# Patient Record
Sex: MALE | Race: OTHER | Employment: FULL TIME | URBAN - METROPOLITAN AREA
[De-identification: names, ages, dates, MRNs, and addresses within clinical notes are randomized per-mention and may not be internally consistent; named-entity substitution may affect disease eponyms.]

---

## 2017-02-17 ENCOUNTER — APPOINTMENT (OUTPATIENT)
Dept: OCCUPATIONAL MEDICINE | Age: 52
End: 2017-02-17
Attending: EMERGENCY MEDICINE

## 2017-12-12 ENCOUNTER — OFFICE VISIT (OUTPATIENT)
Dept: ENDOCRINOLOGY CLINIC | Facility: CLINIC | Age: 52
End: 2017-12-12

## 2017-12-12 ENCOUNTER — TELEPHONE (OUTPATIENT)
Dept: ENDOCRINOLOGY CLINIC | Facility: CLINIC | Age: 52
End: 2017-12-12

## 2017-12-12 VITALS
SYSTOLIC BLOOD PRESSURE: 170 MMHG | DIASTOLIC BLOOD PRESSURE: 103 MMHG | BODY MASS INDEX: 30.97 KG/M2 | WEIGHT: 195 LBS | HEIGHT: 66.5 IN | HEART RATE: 67 BPM

## 2017-12-12 DIAGNOSIS — E29.1 HYPOGONADISM IN MALE: Primary | ICD-10-CM

## 2017-12-12 PROCEDURE — 99212 OFFICE O/P EST SF 10 MIN: CPT | Performed by: INTERNAL MEDICINE

## 2017-12-12 PROCEDURE — 99243 OFF/OP CNSLTJ NEW/EST LOW 30: CPT | Performed by: INTERNAL MEDICINE

## 2017-12-12 NOTE — PATIENT INSTRUCTIONS
Do labs tomorrow before 10 am  We will call you with results and will be starting testosterone replacement as below:      Start testosterone 126 every two weeks.    Repeat your blood work which has been ordered for you one week after the fifth testosterone

## 2017-12-12 NOTE — TELEPHONE ENCOUNTER
Per AM, patient's BP was high in clinic. RN to call patient to see if he is taking medication for hypertension. Also to see if he can check BP at home or at pharmacy. He states that his BP has been high.  He was dx with hypertension in the past but stop

## 2017-12-12 NOTE — H&P
New Patient Evaluation - History and Physical    CONSULT - Reason for Visit: Hypogonadism  Requesting Physician: Dr. Hilton Asif ( Phone number: [phone])    CHIEF COMPLAINT:  Patient presents with:   Other: low T       HISTORY OF PRESENT ILLNESS:   Casandra Angles Externally Cream Apply 1 Application topically 2 (two) times daily. Apply to dark skin spots only.  Disp: 28.4 g Rfl: 4       ALLERGIES:    Pcn [Penicillins]           SOCIAL HISTORY:    Social History    Marital status:             Spouse name: not enlarged and no tenderness  HEMATOLOGIC/LYMPHATICS:  no cervical lymphadenopathy and no supraclavicular lymphadenopathy  LUNGS: clear to auscultation bilaterally, no crackles or wheezing  CARDIOVASCULAR:  regular rate and rhythm, normal S1 and S2  ABDO a deep vein (deep vein thrombosis), which could break loose, travel through the bloodstream and lodge in the  lungs, blocking blood flow (pulmonary embolism), could adversely effect liver and lipids and increase the number of red blood cells, a condition c

## 2017-12-13 ENCOUNTER — APPOINTMENT (OUTPATIENT)
Dept: LAB | Age: 52
End: 2017-12-13
Attending: INTERNAL MEDICINE
Payer: COMMERCIAL

## 2017-12-13 DIAGNOSIS — E29.1 HYPOGONADISM IN MALE: ICD-10-CM

## 2017-12-13 PROCEDURE — 83002 ASSAY OF GONADOTROPIN (LH): CPT

## 2017-12-13 PROCEDURE — 36415 COLL VENOUS BLD VENIPUNCTURE: CPT

## 2017-12-13 PROCEDURE — 83001 ASSAY OF GONADOTROPIN (FSH): CPT

## 2017-12-14 ENCOUNTER — TELEPHONE (OUTPATIENT)
Dept: ENDOCRINOLOGY CLINIC | Facility: CLINIC | Age: 52
End: 2017-12-14

## 2017-12-14 NOTE — TELEPHONE ENCOUNTER
Returned call to patient. He states he was going to start on testosterone replacement per AM but he had to complete labwork first. He is wondering if labwork has resulted and if ok to start on replacement?  Did let him know AM out of the office on Thursday

## 2017-12-14 NOTE — TELEPHONE ENCOUNTER
Pt calling regarding refill rx:Testorone Pill, pls call pt at:[phone],thanks.   *rx not on list

## 2017-12-15 RX ORDER — TESTOSTERONE CYPIONATE 200 MG/ML
126 INJECTION INTRAMUSCULAR
Qty: 10 ML | Refills: 0 | Status: SHIPPED | OUTPATIENT
Start: 2017-12-15 | End: 2018-01-12

## 2017-12-15 RX ORDER — SYRINGE W-NEEDLE,DISPOSAB,3 ML 25GX5/8"
SYRINGE, EMPTY DISPOSABLE MISCELLANEOUS
Qty: 50 EACH | Refills: 0 | Status: SHIPPED | OUTPATIENT
Start: 2017-12-15

## 2017-12-15 RX ORDER — SYRINGE W-NEEDLE,DISPOSAB,3 ML 25GX5/8"
SYRINGE, EMPTY DISPOSABLE MISCELLANEOUS
Qty: 50 EACH | Refills: 0 | Status: SHIPPED | OUTPATIENT
Start: 2017-12-15 | End: 2017-12-15

## 2017-12-15 NOTE — TELEPHONE ENCOUNTER
We can prescribe testosterone 126 mg q 2 weeks. Start testosterone 126 every two weeks. Repeat your blood work which has been ordered for you one week after the fifth testosterone injection.      Return in 6 months.

## 2017-12-15 NOTE — TELEPHONE ENCOUNTER
Patient was given education at [address] [place] [address] on how to inject testosterone at home. Cannot sign due to controlled substance. Orders pending. Called patient.  Left detailed message that he can start testosterone as previously discussed at his LOV and on [facility] instru

## 2017-12-15 NOTE — TELEPHONE ENCOUNTER
Pt calling back - states rx was sent to wrong pharm - needs to go to [facility] [place] - pls re send

## 2018-01-12 RX ORDER — TESTOSTERONE CYPIONATE 200 MG/ML
126 INJECTION INTRAMUSCULAR
Qty: 10 ML | Refills: 0 | Status: SHIPPED | OUTPATIENT
Start: 2018-01-12

## 2018-01-12 NOTE — TELEPHONE ENCOUNTER
Current Outpatient Prescriptions:  Testosterone cypionate 200 MG/ML Intramuscular Solution Inject 0.63 mL (126 mg total) into the muscle every 14 (fourteen) days.  Disp: 10 mL Rfl: 0

## 2019-09-02 ENCOUNTER — HOSPITAL ENCOUNTER (OUTPATIENT)
Age: 54
Discharge: HOME | End: 2019-09-02
Attending: FAMILY MEDICINE
Payer: COMMERCIAL

## 2019-09-02 VITALS
TEMPERATURE: 98 F | HEIGHT: 67 IN | BODY MASS INDEX: 29.82 KG/M2 | HEART RATE: 74 BPM | OXYGEN SATURATION: 97 % | WEIGHT: 190 LBS | RESPIRATION RATE: 18 BRPM | DIASTOLIC BLOOD PRESSURE: 98 MMHG | SYSTOLIC BLOOD PRESSURE: 164 MMHG

## 2019-09-02 DIAGNOSIS — H65.02 NON-RECURRENT ACUTE SEROUS OTITIS MEDIA OF LEFT EAR: Primary | ICD-10-CM

## 2019-09-02 DIAGNOSIS — H10.33 ACUTE CONJUNCTIVITIS OF BOTH EYES, UNSPECIFIED ACUTE CONJUNCTIVITIS TYPE: ICD-10-CM

## 2019-09-02 PROCEDURE — 99213 OFFICE O/P EST LOW 20 MIN: CPT

## 2019-09-02 PROCEDURE — 99204 OFFICE O/P NEW MOD 45 MIN: CPT

## 2019-09-02 RX ORDER — GENTAMICIN SULFATE 3 MG/ML
1 SOLUTION/ DROPS OPHTHALMIC 4 TIMES DAILY
Qty: 5 ML | Refills: 0 | Status: SHIPPED | OUTPATIENT
Start: 2019-09-02 | End: 2019-09-07

## 2019-09-02 RX ORDER — AZITHROMYCIN 250 MG/1
TABLET, FILM COATED ORAL
Qty: 1 PACKAGE | Refills: 0 | Status: SHIPPED | OUTPATIENT
Start: 2019-09-02 | End: 2019-09-07

## 2019-09-02 NOTE — ED PROVIDER NOTES
Patient Seen in: [facility] In [address] [place] [address]    History   Patient presents with: Eye Visual Problem (opthalmic)    Stated Complaint: red eyes     HPI    Patient is here with bilateral eye redness, watery discharge, itching.   Since yeste bulging. Eyes: Pupils are equal, round, and reactive to light. EOM are normal. Right eye exhibits no discharge. Left eye exhibits no discharge. No scleral icterus. Bilateral conjunctivitis. Neck: Normal range of motion. Neck supple. No JVD present.